# Patient Record
Sex: FEMALE
[De-identification: names, ages, dates, MRNs, and addresses within clinical notes are randomized per-mention and may not be internally consistent; named-entity substitution may affect disease eponyms.]

---

## 2018-01-22 ENCOUNTER — HOSPITAL ENCOUNTER (OUTPATIENT)
Dept: HOSPITAL 42 - SDS | Age: 83
Discharge: HOME | End: 2018-01-22
Attending: UROLOGY
Payer: MEDICARE

## 2018-01-22 VITALS — RESPIRATION RATE: 18 BRPM

## 2018-01-22 VITALS
OXYGEN SATURATION: 94 % | TEMPERATURE: 98.3 F | DIASTOLIC BLOOD PRESSURE: 65 MMHG | SYSTOLIC BLOOD PRESSURE: 143 MMHG | HEART RATE: 70 BPM

## 2018-01-22 VITALS — BODY MASS INDEX: 21.9 KG/M2

## 2018-01-22 DIAGNOSIS — C67.6: Primary | ICD-10-CM

## 2018-01-22 DIAGNOSIS — R31.0: ICD-10-CM

## 2018-01-22 PROCEDURE — 52204 CYSTOSCOPY W/BIOPSY(S): CPT

## 2018-01-22 PROCEDURE — 52354 CYSTOURETERO W/BIOPSY: CPT

## 2018-01-22 PROCEDURE — 88305 TISSUE EXAM BY PATHOLOGIST: CPT

## 2018-01-22 PROCEDURE — 88104 CYTOPATH FL NONGYN SMEARS: CPT

## 2018-01-22 PROCEDURE — 74420 UROGRAPHY RTRGR +-KUB: CPT

## 2018-01-22 NOTE — RAD
PROCEDURE:  Retrograde pyelogram



HISTORY:

LEFT RETROGRADE, RIGHT BIOPSY



COMPARISON:





TECHNIQUE:

Fluoro was provided in the operating room. 84.6 seconds of fluoro 

time were used. Seven images were submitted



FINDINGS:

The left ureter and renal collecting system are unremarkable. On the 

right side there are filling defects in the renal pelvis. The 

previous CT showed a solid mass in this area.



IMPRESSION:

As above

## 2018-01-23 NOTE — OP
PROCEDURE DATE:  01/22/2018



PREOPERATIVE DIAGNOSES:  Gross hematuria, right renal pelvic tumor.



POSTOPERATIVE DIAGNOSES:  Gross hematuria, right renal pelvic tumor plus

right ureteral tumor and bladder tumor.



PROCEDURES:  Cystoscopy, bilateral retrograde pyelograms, bladder biopsy

and fulguration, right ureteroscopy, biopsy of right ureteral tumor and

biopsies of right renal pelvic tumor.



SPECIMENS:  Bladder biopsies were sent to pathology.  Right ureteral

biopsies were sent to pathology.  Right renal pelvic biopsies were sent to

pathology and barbotage cytologies were sent to pathology.



DRAINS:  There were none.



COMPLICATIONS:  There were none.



OPERATIVE FINDINGS:  After informed consent was obtained, the patient was

taken to operating room and placed on the operating table.  Anesthesia was

administered.  The patient was then placed in the dorsal lithotomy position

and prepped and draped in usual sterile fashion.  A 21-[place] cystoscope

was passed into the patient's bladder and a full survey inspection was

performed.  There were no stones or foreign bodies noted.  There was

grossly bloody urine noted exiting from the right ureteral orifice.  The

left orifice had normal-appearing urine exiting.  There was a small ragged

area just proximal to the right ureteral orifice with some raised lesions. 

It did not appear papillary, but was suspicious.  At this point, an

open-ended ureteral catheter was advanced through the cystoscope and guided

into the right ureteral orifice.  A right retrograde pyelogram was then

performed by filling contrast into the right ureter during real-time

fluoroscopy.  There was a large ragged filling defect noted in the right

renal pelvis.  At this point, the open-ended ureteral catheter was guided

into the left ureteral orifice and contrast was instilled into the left

system.  The left system appeared within normal limits.  There was no

filling defects or evidence of obstruction.  At this point, a sensor wire

was obtained.  The ureteral catheter was put back into the right orifice

and the guidewire was then advanced into the right ureter.  The guidewire

was advanced until it coiled in the upper collecting system.  At this

point, a semi-rigid ureteroscope was obtained that was passed under direct

vision into the bladder.  There were some redundancy in the orifice and it

was difficult to pass the ureteroscope.  At this point, the ureteroscope

was withdrawn and a ureteral access sheath was obtained.  First, the

obturator was passed over the wire, it was inserted easily into the ureter

and advanced into the mid ureter under fluoroscopic guidance.  The

obturator was left in place for a few minutes and then withdrawn.  The

ureteral access catheter was then passed with the obturator in place over

the wire.  It was able to be passed without difficulty into the ureter.  At

this point, an 8-[place] semi-rigid ureteroscope was obtained, it was passed

through the ureteral access sheath into the ureter and guided up the ureter

adjacent to the wire.  There was some difficulty with the wire in place and

the wire was then removed with the access sheath in place in the upper

ureter.  The ureteroscope was able to be passed into the ureter.  During

passage, there was noted to be a papillary tumor in the mid to upper ureter

on the medial lateral wall of the ureter.  The ureteroscope was able to be

advanced up to the area of the renal pelvis, but not into the renal pelvis.

At this point, the ureteroscope was then withdrawn and biopsies of the

ureteral tumor were taken and sent to Pathology as specimen.  At this

point, the wire was re-passed.  The access sheath was then removed and a

second wire was placed as a safety wire.  The ureteral access sheath was

then again placed over one of the wires which were then left in place and a

flexible ureteroscope was obtained.  The flexible ureteroscope was then

passed over the wire through the ureteral access sheath and advanced up the

ureter until it was in place in the renal pelvis.  The guidewire was then

removed.  Visualization of the renal pelvis was able to be performed. 

There was noted to be a very large papillary tumor in the renal pelvis,

mostly on the lateral and superior walls of the renal pelvis.  At this

point, a flexible biopsy forceps was passed through the flexible

ureteroscope.  Given the location of the tumor, it was difficult to deflect

the scope with the biopsy forceps in place.  I was able to take a few small

biopsies of the tumor, which were then sent to pathology as specimen. 

After the biopsies were taken, barbotage cytologies were performed through

the ureteral access sheath after the flexible scope was removed.  The

barbotage cytologies were sent to pathology as a separate specimen.  At

this point, contrast was placed through the access sheath.  There was no

evidence of extravasation.  A wire was passed through the access sheath and

the access sheath was then removed.  The wires were then removed under

fluoroscopic guidance.  The cystoscope was then re-passed into the bladder.

A cold cup forceps was passed and biopsy of the growth adjacent to the

right ureteral orifice were taken and sent to pathology as a separate

specimen.  A Bugbee electrode was then passed and the biopsy sites were

then cauterized.  A final inspection was then made.  There was complete

hemostasis from the bladder biopsies.  There was some lightly blood-tinged

urine noted exiting from the right ureteral orifice with clear urine

exiting from the left ureteral orifice.  At this point, procedure was

completed.  The bladder was drained and the cystoscope was removed.  The

patient tolerated the procedure well and she was taken to the recovery room

in awake and stable condition.





__________________________________________

Felix Coreas MD





DD:  01/22/2018 10:11:48

DT:  01/22/2018 10:21:37

Job # [id number]